# Patient Record
Sex: MALE
[De-identification: names, ages, dates, MRNs, and addresses within clinical notes are randomized per-mention and may not be internally consistent; named-entity substitution may affect disease eponyms.]

---

## 2022-06-02 ENCOUNTER — NURSE TRIAGE (OUTPATIENT)
Dept: OTHER | Facility: CLINIC | Age: 22
End: 2022-06-02

## 2022-06-02 NOTE — TELEPHONE ENCOUNTER
Subjective: Caller mother states \"My son says his chest is hurting\"     Current Symptoms: Chest pain, R side of chest, radiating to neck, sob, worse on deep breathing    Denies injury    Onset: 0100, today, woke from sleep    Associated Symptoms: NA    Pain Severity: 7/10; sharp, pressure; intermittent    Temperature: Denies fever or chills     What has been tried: Tylenol    Recommended disposition: Go to ED Now    Care advice provided, patient verbalizes understanding; denies any other questions or concerns; instructed to call back for any new or worsening symptoms. Patient/caller agrees to proceed to local Emergency Department Pt mother reluctant to go. This triage is a result of a call to [address] [place] [address]. Please do not respond to the triage nurse through this encounter. Any subsequent communication should be directly with the patient.       Reason for Disposition   [1] Chest pain lasts > 5 minutes AND [2] occurred in past 3 days (72 hours) (Exception: feels exactly the same as previously diagnosed heartburn and has accompanying sour taste in mouth)    Protocols used: CHEST PAIN-ADULT-

## 2022-08-05 ENCOUNTER — NURSE TRIAGE (OUTPATIENT)
Dept: OTHER | Facility: CLINIC | Age: 22
End: 2022-08-05

## 2022-08-05 NOTE — TELEPHONE ENCOUNTER
Subjective: Caller states \"I had a medical related question about something I am experiencing. My left ear is muffled like it is blocked. This started yesterday morning when I woke up. \"     Current Symptoms: Left ear muffled like cotton in it. Onset: 1 day ago; sudden    Associated Symptoms: NA    Pain Severity: 0/10; N/A; none    Temperature: patient denies by unknown method    What has been tried: flush with water, debrox from [place]     LMP: NA Pregnant: No    Recommended disposition: Go to Office Now    Care advice provided, patient verbalizes understanding; denies any other questions or concerns; instructed to call back for any new or worsening symptoms. Patient/caller agrees to follow-up with PCP     This triage is a result of a call to [address] [place] [address]. Please do not respond to the triage nurse through this encounter. Any subsequent communication should be directly with the patient.       Reason for Disposition   Hearing loss (complete or partial) is main symptom   Hearing loss in one or both ears of sudden onset and present now    Protocols used: Ear - Congestion-ADULT-[place], Hearing Loss or Change-ADULT-[place]